# Patient Record
Sex: FEMALE | Race: WHITE | NOT HISPANIC OR LATINO | Employment: UNEMPLOYED | URBAN - METROPOLITAN AREA
[De-identification: names, ages, dates, MRNs, and addresses within clinical notes are randomized per-mention and may not be internally consistent; named-entity substitution may affect disease eponyms.]

---

## 2019-06-20 ENCOUNTER — HOSPITAL ENCOUNTER (EMERGENCY)
Facility: HOSPITAL | Age: 39
Discharge: HOME | End: 2019-06-20
Attending: EMERGENCY MEDICINE | Admitting: EMERGENCY MEDICINE

## 2019-06-20 VITALS
RESPIRATION RATE: 18 BRPM | DIASTOLIC BLOOD PRESSURE: 85 MMHG | HEART RATE: 95 BPM | BODY MASS INDEX: 22.49 KG/M2 | SYSTOLIC BLOOD PRESSURE: 142 MMHG | WEIGHT: 135 LBS | TEMPERATURE: 97.8 F | HEIGHT: 65 IN | OXYGEN SATURATION: 96 %

## 2019-06-20 DIAGNOSIS — M54.12 CERVICAL RADICULOPATHY: Primary | ICD-10-CM

## 2019-06-20 PROCEDURE — 96372 THER/PROPH/DIAG INJ SC/IM: CPT

## 2019-06-20 PROCEDURE — 25010000002 KETOROLAC TROMETHAMINE PER 15 MG: Performed by: PHYSICIAN ASSISTANT

## 2019-06-20 PROCEDURE — 99282 EMERGENCY DEPT VISIT SF MDM: CPT | Performed by: PHYSICIAN ASSISTANT

## 2019-06-20 PROCEDURE — 99283 EMERGENCY DEPT VISIT LOW MDM: CPT

## 2019-06-20 RX ORDER — IBUPROFEN 400 MG/1
400 TABLET ORAL 2 TIMES DAILY
COMMUNITY
End: 2021-03-15 | Stop reason: HOSPADM

## 2019-06-20 RX ORDER — ALPRAZOLAM 2 MG/1
2 TABLET ORAL NIGHTLY PRN
COMMUNITY
End: 2021-03-14

## 2019-06-20 RX ORDER — CETIRIZINE HYDROCHLORIDE 10 MG/1
10 TABLET ORAL DAILY
COMMUNITY
End: 2021-03-14

## 2019-06-20 RX ORDER — KETOROLAC TROMETHAMINE 30 MG/ML
60 INJECTION, SOLUTION INTRAMUSCULAR; INTRAVENOUS ONCE
Status: COMPLETED | OUTPATIENT
Start: 2019-06-20 | End: 2019-06-20

## 2019-06-20 RX ORDER — METHYLPREDNISOLONE 4 MG/1
TABLET ORAL
Qty: 21 TABLET | Refills: 0 | Status: SHIPPED | OUTPATIENT
Start: 2019-06-20 | End: 2019-06-20 | Stop reason: SINTOL

## 2019-06-20 RX ORDER — FLUTICASONE PROPIONATE 50 MCG
2 SPRAY, SUSPENSION (ML) NASAL DAILY
COMMUNITY

## 2019-06-20 RX ORDER — MONTELUKAST SODIUM 10 MG/1
10 TABLET ORAL NIGHTLY
COMMUNITY

## 2019-06-20 RX ORDER — GABAPENTIN 800 MG/1
800 TABLET ORAL 3 TIMES DAILY
COMMUNITY
End: 2021-03-14

## 2019-06-20 RX ADMIN — KETOROLAC TROMETHAMINE 60 MG: 30 INJECTION, SOLUTION INTRAMUSCULAR at 19:22

## 2021-03-14 ENCOUNTER — APPOINTMENT (OUTPATIENT)
Dept: GENERAL RADIOLOGY | Facility: HOSPITAL | Age: 41
End: 2021-03-14

## 2021-03-14 ENCOUNTER — HOSPITAL ENCOUNTER (OUTPATIENT)
Facility: HOSPITAL | Age: 41
Setting detail: OBSERVATION
Discharge: HOME | End: 2021-03-15
Attending: INTERNAL MEDICINE | Admitting: INTERNAL MEDICINE

## 2021-03-14 DIAGNOSIS — R07.9 CHEST PAIN, UNSPECIFIED TYPE: Primary | ICD-10-CM

## 2021-03-14 DIAGNOSIS — R06.00 DYSPNEA, UNSPECIFIED TYPE: ICD-10-CM

## 2021-03-14 PROBLEM — F41.1 GENERALIZED ANXIETY DISORDER: Status: ACTIVE | Noted: 2021-03-14

## 2021-03-14 PROBLEM — R10.819 LEFT FLANK TENDERNESS: Status: ACTIVE | Noted: 2021-03-14

## 2021-03-14 PROBLEM — F41.0 PANIC ATTACKS: Status: ACTIVE | Noted: 2021-03-14

## 2021-03-14 PROBLEM — K21.9 GASTROESOPHAGEAL REFLUX DISEASE WITHOUT ESOPHAGITIS: Status: ACTIVE | Noted: 2021-03-14

## 2021-03-14 LAB
ALBUMIN SERPL-MCNC: 4.8 G/DL (ref 3.5–5.2)
ALBUMIN/GLOB SERPL: 1.9 G/DL
ALP SERPL-CCNC: 128 U/L (ref 39–117)
ALT SERPL W P-5'-P-CCNC: 18 U/L (ref 1–33)
ANION GAP SERPL CALCULATED.3IONS-SCNC: 16 MMOL/L (ref 5–15)
AST SERPL-CCNC: 12 U/L (ref 1–32)
B PARAPERT DNA SPEC QL NAA+PROBE: NOT DETECTED
B PERT DNA SPEC QL NAA+PROBE: NOT DETECTED
BASOPHILS # BLD AUTO: 0.1 10*3/MM3 (ref 0–0.2)
BASOPHILS NFR BLD AUTO: 0.6 % (ref 0–1.5)
BILIRUB SERPL-MCNC: 0.5 MG/DL (ref 0–1.2)
BUN SERPL-MCNC: 6 MG/DL (ref 6–20)
BUN/CREAT SERPL: 8.7 (ref 7–25)
C PNEUM DNA NPH QL NAA+NON-PROBE: NOT DETECTED
CALCIUM SPEC-SCNC: 10.2 MG/DL (ref 8.6–10.5)
CHLORIDE SERPL-SCNC: 106 MMOL/L (ref 98–107)
CO2 SERPL-SCNC: 20 MMOL/L (ref 22–29)
CREAT SERPL-MCNC: 0.69 MG/DL (ref 0.57–1)
D DIMER PPP FEU-MCNC: <0.19 MG/L (FEU) (ref 0–0.59)
DEPRECATED RDW RBC AUTO: 47.7 FL (ref 37–54)
EOSINOPHIL # BLD AUTO: 0 10*3/MM3 (ref 0–0.4)
EOSINOPHIL NFR BLD AUTO: 0.4 % (ref 0.3–6.2)
ERYTHROCYTE [DISTWIDTH] IN BLOOD BY AUTOMATED COUNT: 14.8 % (ref 12.3–15.4)
FLUAV SUBTYP SPEC NAA+PROBE: NOT DETECTED
FLUBV RNA ISLT QL NAA+PROBE: NOT DETECTED
GFR SERPL CREATININE-BSD FRML MDRD: 94 ML/MIN/1.73
GLOBULIN UR ELPH-MCNC: 2.5 GM/DL
GLUCOSE SERPL-MCNC: 128 MG/DL (ref 65–99)
HADV DNA SPEC NAA+PROBE: NOT DETECTED
HCOV 229E RNA SPEC QL NAA+PROBE: NOT DETECTED
HCOV HKU1 RNA SPEC QL NAA+PROBE: NOT DETECTED
HCOV NL63 RNA SPEC QL NAA+PROBE: NOT DETECTED
HCOV OC43 RNA SPEC QL NAA+PROBE: NOT DETECTED
HCT VFR BLD AUTO: 38.8 % (ref 34–46.6)
HGB BLD-MCNC: 14.1 G/DL (ref 12–15.9)
HMPV RNA NPH QL NAA+NON-PROBE: NOT DETECTED
HOLD SPECIMEN: NORMAL
HOLD SPECIMEN: NORMAL
HPIV1 RNA SPEC QL NAA+PROBE: NOT DETECTED
HPIV2 RNA SPEC QL NAA+PROBE: NOT DETECTED
HPIV3 RNA NPH QL NAA+PROBE: NOT DETECTED
HPIV4 P GENE NPH QL NAA+PROBE: NOT DETECTED
LYMPHOCYTES # BLD AUTO: 1.5 10*3/MM3 (ref 0.7–3.1)
LYMPHOCYTES NFR BLD AUTO: 12.2 % (ref 19.6–45.3)
M PNEUMO IGG SER IA-ACNC: NOT DETECTED
MCH RBC QN AUTO: 33.7 PG (ref 26.6–33)
MCHC RBC AUTO-ENTMCNC: 36.5 G/DL (ref 31.5–35.7)
MCV RBC AUTO: 92.5 FL (ref 79–97)
MONOCYTES # BLD AUTO: 0.5 10*3/MM3 (ref 0.1–0.9)
MONOCYTES NFR BLD AUTO: 4.1 % (ref 5–12)
NEUTROPHILS NFR BLD AUTO: 10.4 10*3/MM3 (ref 1.7–7)
NEUTROPHILS NFR BLD AUTO: 82.7 % (ref 42.7–76)
NRBC BLD AUTO-RTO: 0.1 /100 WBC (ref 0–0.2)
NT-PROBNP SERPL-MCNC: 50.6 PG/ML (ref 0–450)
PLATELET # BLD AUTO: 465 10*3/MM3 (ref 140–450)
PMV BLD AUTO: 6.9 FL (ref 6–12)
POTASSIUM SERPL-SCNC: 3.9 MMOL/L (ref 3.5–5.2)
PROT SERPL-MCNC: 7.3 G/DL (ref 6–8.5)
RBC # BLD AUTO: 4.19 10*6/MM3 (ref 3.77–5.28)
RHINOVIRUS RNA SPEC NAA+PROBE: NOT DETECTED
RSV RNA NPH QL NAA+NON-PROBE: NOT DETECTED
SARS-COV-2 RNA NPH QL NAA+NON-PROBE: NOT DETECTED
SODIUM SERPL-SCNC: 142 MMOL/L (ref 136–145)
TROPONIN T SERPL-MCNC: <0.01 NG/ML (ref 0–0.03)
TROPONIN T SERPL-MCNC: <0.01 NG/ML (ref 0–0.03)
WBC # BLD AUTO: 12.6 10*3/MM3 (ref 3.4–10.8)
WHOLE BLOOD HOLD SPECIMEN: NORMAL

## 2021-03-14 PROCEDURE — G0378 HOSPITAL OBSERVATION PER HR: HCPCS

## 2021-03-14 PROCEDURE — 93005 ELECTROCARDIOGRAM TRACING: CPT | Performed by: INTERNAL MEDICINE

## 2021-03-14 PROCEDURE — 99220 PR INITIAL OBSERVATION CARE/DAY 70 MINUTES: CPT | Performed by: INTERNAL MEDICINE

## 2021-03-14 PROCEDURE — 93005 ELECTROCARDIOGRAM TRACING: CPT

## 2021-03-14 PROCEDURE — 99284 EMERGENCY DEPT VISIT MOD MDM: CPT

## 2021-03-14 PROCEDURE — 0202U NFCT DS 22 TRGT SARS-COV-2: CPT | Performed by: NURSE PRACTITIONER

## 2021-03-14 PROCEDURE — 85025 COMPLETE CBC W/AUTO DIFF WBC: CPT | Performed by: NURSE PRACTITIONER

## 2021-03-14 PROCEDURE — 84484 ASSAY OF TROPONIN QUANT: CPT | Performed by: INTERNAL MEDICINE

## 2021-03-14 PROCEDURE — 71045 X-RAY EXAM CHEST 1 VIEW: CPT

## 2021-03-14 PROCEDURE — 80053 COMPREHEN METABOLIC PANEL: CPT | Performed by: NURSE PRACTITIONER

## 2021-03-14 PROCEDURE — 85379 FIBRIN DEGRADATION QUANT: CPT | Performed by: NURSE PRACTITIONER

## 2021-03-14 PROCEDURE — 96374 THER/PROPH/DIAG INJ IV PUSH: CPT

## 2021-03-14 PROCEDURE — 83880 ASSAY OF NATRIURETIC PEPTIDE: CPT | Performed by: NURSE PRACTITIONER

## 2021-03-14 PROCEDURE — 84484 ASSAY OF TROPONIN QUANT: CPT | Performed by: NURSE PRACTITIONER

## 2021-03-14 RX ORDER — NITROGLYCERIN 0.4 MG/1
0.4 TABLET SUBLINGUAL
Status: DISCONTINUED | OUTPATIENT
Start: 2021-03-14 | End: 2021-03-15 | Stop reason: HOSPADM

## 2021-03-14 RX ORDER — ACETAMINOPHEN 500 MG
1000 TABLET ORAL DAILY
COMMUNITY

## 2021-03-14 RX ORDER — ASPIRIN 81 MG/1
324 TABLET, CHEWABLE ORAL ONCE
Status: COMPLETED | OUTPATIENT
Start: 2021-03-14 | End: 2021-03-14

## 2021-03-14 RX ORDER — CHOLECALCIFEROL (VITAMIN D3) 125 MCG
5 CAPSULE ORAL NIGHTLY PRN
Status: DISCONTINUED | OUTPATIENT
Start: 2021-03-14 | End: 2021-03-15 | Stop reason: HOSPADM

## 2021-03-14 RX ORDER — BISACODYL 10 MG
10 SUPPOSITORY, RECTAL RECTAL DAILY PRN
Status: DISCONTINUED | OUTPATIENT
Start: 2021-03-14 | End: 2021-03-15 | Stop reason: HOSPADM

## 2021-03-14 RX ORDER — SODIUM CHLORIDE 0.9 % (FLUSH) 0.9 %
10 SYRINGE (ML) INJECTION EVERY 12 HOURS SCHEDULED
Status: DISCONTINUED | OUTPATIENT
Start: 2021-03-14 | End: 2021-03-15 | Stop reason: HOSPADM

## 2021-03-14 RX ORDER — ALPRAZOLAM 0.5 MG/1
0.5 TABLET ORAL 3 TIMES DAILY PRN
Status: DISCONTINUED | OUTPATIENT
Start: 2021-03-14 | End: 2021-03-15 | Stop reason: HOSPADM

## 2021-03-14 RX ORDER — ACETAMINOPHEN 160 MG/5ML
650 SOLUTION ORAL EVERY 4 HOURS PRN
Status: DISCONTINUED | OUTPATIENT
Start: 2021-03-14 | End: 2021-03-15 | Stop reason: HOSPADM

## 2021-03-14 RX ORDER — ONDANSETRON 2 MG/ML
4 INJECTION INTRAMUSCULAR; INTRAVENOUS EVERY 6 HOURS PRN
Status: DISCONTINUED | OUTPATIENT
Start: 2021-03-14 | End: 2021-03-15 | Stop reason: HOSPADM

## 2021-03-14 RX ORDER — ACETAMINOPHEN 650 MG/1
650 SUPPOSITORY RECTAL EVERY 4 HOURS PRN
Status: DISCONTINUED | OUTPATIENT
Start: 2021-03-14 | End: 2021-03-15 | Stop reason: HOSPADM

## 2021-03-14 RX ORDER — ACETAMINOPHEN 325 MG/1
650 TABLET ORAL EVERY 4 HOURS PRN
Status: DISCONTINUED | OUTPATIENT
Start: 2021-03-14 | End: 2021-03-15 | Stop reason: HOSPADM

## 2021-03-14 RX ORDER — PANTOPRAZOLE SODIUM 40 MG/1
40 TABLET, DELAYED RELEASE ORAL
Status: DISCONTINUED | OUTPATIENT
Start: 2021-03-14 | End: 2021-03-15 | Stop reason: HOSPADM

## 2021-03-14 RX ORDER — MONTELUKAST SODIUM 10 MG/1
10 TABLET ORAL NIGHTLY
Status: DISCONTINUED | OUTPATIENT
Start: 2021-03-15 | End: 2021-03-15 | Stop reason: HOSPADM

## 2021-03-14 RX ORDER — ONDANSETRON 4 MG/1
4 TABLET, FILM COATED ORAL EVERY 6 HOURS PRN
Status: DISCONTINUED | OUTPATIENT
Start: 2021-03-14 | End: 2021-03-15 | Stop reason: HOSPADM

## 2021-03-14 RX ORDER — SODIUM CHLORIDE 0.9 % (FLUSH) 0.9 %
10 SYRINGE (ML) INJECTION AS NEEDED
Status: DISCONTINUED | OUTPATIENT
Start: 2021-03-14 | End: 2021-03-15 | Stop reason: HOSPADM

## 2021-03-14 RX ORDER — BISACODYL 5 MG/1
5 TABLET, DELAYED RELEASE ORAL DAILY PRN
Status: DISCONTINUED | OUTPATIENT
Start: 2021-03-14 | End: 2021-03-15 | Stop reason: HOSPADM

## 2021-03-14 RX ADMIN — ASPIRIN 324 MG: 81 TABLET, CHEWABLE ORAL at 17:33

## 2021-03-14 RX ADMIN — Medication 5 MG: at 21:02

## 2021-03-14 RX ADMIN — Medication 10 ML: at 19:19

## 2021-03-14 RX ADMIN — ALPRAZOLAM 0.5 MG: 0.5 TABLET ORAL at 22:22

## 2021-03-14 RX ADMIN — ACETAMINOPHEN 650 MG: 325 TABLET ORAL at 21:02

## 2021-03-14 RX ADMIN — PANTOPRAZOLE SODIUM 40 MG: 40 TABLET, DELAYED RELEASE ORAL at 19:19

## 2021-03-14 NOTE — H&P
[facility]      Patient Name: Pamela Bateman  : 1980  MRN: [id number]  Primary Care Physician: Provider, No Known  Date of admission: 3/14/2021    Patient Care Team:  Provider, No Known as PCP - General          Subjective   History Present Illness     Chief Complaint:   Chief Complaint   Patient presents with   • Chest Pain         Ms. Bateman is a 40 y.o. female with anxiety/depression, panic attacks, GERD, chronic back pain, history of substance abuse, and history of pneumothorax, who presents to [facility] complaining of multiple vague complaints but mostly left-sided chest wall pain.  She is a poor historian due to her untreated anxiety.  She tends to go off on tangential topics, and her speech is a little pressured.  She presents with progressive and worsening reflux and heartburn which has caused some pain in the left and center of her chest radiating up towards the left shoulder.  She then began to experience some pain in her left flank and lateral chest wall.  She thought perhaps this was related to her previous history of pneumothorax especially when she became short of air so she came in for further evaluation.    She has been taking 1 Xanax and 1 Norco 7.5 every night without prescription per INSPECT reviewed.  She has lost her insurance and has delayed ongoing medical care for several months due to the pandemic and also while caring for her mother who is terminally ill with cancer.           Review of Systems   Constitutional: Negative. Negative for chills and fever.   HENT: Positive for congestion.         Sinus drainage   Cardiovascular: Positive for chest pain. Negative for leg swelling, orthopnea, palpitations and syncope.   Respiratory: Positive for shortness of breath. Negative for cough, sputum production and wheezing.    Skin: Negative.  Negative for itching, rash and suspicious lesions.   Musculoskeletal: Positive for back pain.    Gastrointestinal: Positive for bloating, abdominal pain, heartburn and nausea. Negative for constipation, diarrhea and vomiting.   Genitourinary: Positive for flank pain. Negative for dysuria, frequency, hematuria, nocturia and urgency.   Neurological: Negative.    Psychiatric/Behavioral: The patient is nervous/anxious.    All other systems reviewed and are negative.          Personal History     Past Medical History:   Past Medical History:   Diagnosis Date   • Anxiety    • Chronic back pain    • Depression    • Hx of pneumothorax    • Neuropathy    • Sciatica    • Seasonal allergies        Surgical History:      Past Surgical History:   Procedure Laterality Date   • APPENDECTOMY     • BACK SURGERY     • EAR TUBES     • TUBAL ABDOMINAL LIGATION     • WISDOM TOOTH EXTRACTION         Family History: family history includes Hearing loss in her maternal grandfather and maternal grandmother; Heart disease in her maternal grandfather, maternal grandmother, and mother; Hyperlipidemia in her maternal grandfather, maternal grandmother, and mother; Hypertension in her mother. Otherwise pertinent FHx was reviewed and unremarkable.     Social History:  reports that she has been smoking cigarettes. She has been smoking about 1.00 pack per day. She does not have any smokeless tobacco history on file. She reports current alcohol use. She reports that she does not use drugs.      Medications:  Prior to Admission medications    Medication Sig Start Date End Date Taking? Authorizing Provider   acetaminophen (TYLENOL) 500 MG tablet Take 1,000 mg by mouth Daily.   Yes Adarsh Lockhart MD   fluticasone (FLONASE) 50 MCG/ACT nasal spray 2 sprays into the nostril(s) as directed by provider Daily.   Yes Adarsh Lockhart MD   ibuprofen (ADVIL,MOTRIN) 400 MG tablet Take 400 mg by mouth 2 (two) times a day.   Yes Adarsh Lockhart MD   montelukast (SINGULAIR) 10 MG tablet Take 10 mg by mouth Every Night.   Yes Richy  MD Adarsh   ALPRAZolam (XANAX) 2 MG tablet Take 2 mg by mouth At Night As Needed for Anxiety.  3/14/21  ProviderAdarsh MD   cetirizine (zyrTEC) 10 MG tablet Take 10 mg by mouth Daily.  3/14/21  Adarsh Lockhart MD   gabapentin (NEURONTIN) 800 MG tablet Take 800 mg by mouth 3 (Three) Times a Day.  3/14/21  ProviderAdarsh MD       Allergies:  No Known Allergies    Objective   Objective     Vital Signs  Temp:  [98.2 °F (36.8 °C)] 98.2 °F (36.8 °C)  Heart Rate:  [79-81] 79  Resp:  [22] 22  BP: (135-136)/(80-84) 136/84  SpO2:  [91 %-100 %] 91 %  on   ;   Device (Oxygen Therapy): room air  Body mass index is 23.55 kg/m².    Physical Exam  Vitals reviewed.   Constitutional:       General: She is not in acute distress.     Appearance: Normal appearance. She is normal weight. She is not ill-appearing or toxic-appearing.   HENT:      Head: Normocephalic and atraumatic.      Nose: Nose normal.      Mouth/Throat:      Mouth: Mucous membranes are moist.      Pharynx: Oropharynx is clear.   Eyes:      General: No scleral icterus.     Extraocular Movements: Extraocular movements intact.      Conjunctiva/sclera: Conjunctivae normal.      Pupils: Pupils are equal, round, and reactive to light.   Cardiovascular:      Rate and Rhythm: Normal rate and regular rhythm.      Pulses: Normal pulses.      Heart sounds: Normal heart sounds. No murmur.   Pulmonary:      Effort: Pulmonary effort is normal. No respiratory distress.      Breath sounds: Normal breath sounds. No stridor. No wheezing, rhonchi or rales.   Abdominal:      General: Abdomen is flat. Bowel sounds are normal. There is no distension.      Palpations: Abdomen is soft.      Tenderness: There is no abdominal tenderness. There is left CVA tenderness.   Musculoskeletal:         General: No swelling. Normal range of motion.      Cervical back: Normal range of motion and neck supple.   Skin:     General: Skin is warm and dry.   Neurological:      General:  No focal deficit present.      Mental Status: She is alert and oriented to person, place, and time. Mental status is at baseline.      Motor: No weakness.   Psychiatric:         Mood and Affect: Mood is anxious.         Speech: Speech is rapid and pressured and tangential.         Behavior: Behavior is cooperative.         Thought Content: Thought content normal.         Cognition and Memory: Cognition and memory normal.           Results Review:  I have personally reviewed most recent cardiac tracings, lab results and radiology images and interpretations and agree with findings.    Results from last 7 days   Lab Units 03/14/21  1549   WBC 10*3/mm3 12.60*   HEMOGLOBIN g/dL 14.1   HEMATOCRIT % 38.8   PLATELETS 10*3/mm3 465*     Results from last 7 days   Lab Units 03/14/21  1549   SODIUM mmol/L 142   POTASSIUM mmol/L 3.9   CHLORIDE mmol/L 106   CO2 mmol/L 20.0*   BUN mg/dL 6   CREATININE mg/dL 0.69   GLUCOSE mg/dL 128*   CALCIUM mg/dL 10.2   ALT (SGPT) U/L 18   AST (SGOT) U/L 12   TROPONIN T ng/mL <0.010   PROBNP pg/mL 50.6     Estimated Creatinine Clearance: 109.8 mL/min (by C-G formula based on SCr of 0.69 mg/dL).  Brief Urine Lab Results     None          Microbiology Results (last 10 days)     Procedure Component Value - Date/Time    Respiratory Panel PCR w/COVID-19(SARS-CoV-2) [facility] In-House, NP Swab in UTM/VTM, 3-4 HR TAT - Swab, Nasopharynx [[id number]]  (Normal) Collected: 03/14/21 1550    Lab Status: Final result Specimen: Swab from Nasopharynx Updated: 03/14/21 1644     ADENOVIRUS, PCR Not Detected     Coronavirus 229E Not Detected     Coronavirus HKU1 Not Detected     Coronavirus NL63 Not Detected     Coronavirus OC43 Not Detected     COVID19 Not Detected     Human Metapneumovirus Not Detected     Human Rhinovirus/Enterovirus Not Detected     Influenza A PCR Not Detected     Influenza B PCR Not Detected     Parainfluenza Virus 1 Not Detected     Parainfluenza Virus 2 Not Detected      Parainfluenza Virus 3 Not Detected     Parainfluenza Virus 4 Not Detected     RSV, PCR Not Detected     Bordetella pertussis pcr Not Detected     Bordetella parapertussis PCR Not Detected     Chlamydophila pneumoniae PCR Not Detected     Mycoplasma pneumo by PCR Not Detected    Narrative:      Fact sheet for providers: https://docs.[employer]/wp-content/uploads/VKM6995-7836-FC4.1-EUA-Provider-Fact-Sheet-3.pdf    Fact sheet for patients: https://docs.[employer]/wp-content/uploads/VDD4267-5364-GJ0.1-EUA-Patient-Fact-Sheet-1.pdf    Test performed by PCR.          ECG/EMG Results (most recent)     Procedure Component Value Units Date/Time    ECG 12 Lead [[id number]] Collected: 03/14/21 1538     Updated: 03/14/21 1539     QT Interval 357 ms     Narrative:      HEART RATE= 81  bpm  RR Interval= 744  ms  MN Interval= 139  ms  P Horizontal Axis= -19  deg  P Front Axis= 64  deg  QRSD Interval= 88  ms  QT Interval= 357  ms  QRS Axis= 73  deg  T Wave Axis= 57  deg  - NORMAL ECG -  Sinus rhythm  Electronically Signed By:   Date and Time of Study: 2021-03-14 15:38:14                  XR Chest 1 View    Result Date: 3/14/2021  No active disease.  Electronically Signed By-Ant Vincent MD On:3/14/2021 4:29 PM This report was finalized on [id number] by  Ant Vincent MD.        Estimated Creatinine Clearance: 109.8 mL/min (by C-G formula based on SCr of 0.69 mg/dL).    Assessment/Plan   Assessment/Plan       Active Hospital Problems    Diagnosis  POA   • Chest pain [R07.9]  Yes   • Dyspnea [R06.00]  Unknown      Resolved Hospital Problems   No resolved problems to display.     Chest pain  -Strong family history of cardiac disease with hypertension and hyperlipidemia  -Will trend serial troponins to rule out ACS  -Treadmill stress test in a.m. to rule out MI  -Consider alternate form of imaging to evaluate for possible pneumothorax as a source for chest pain    Severe reflux  -Start patient on PPI, may need to add Carafate  -If  no improvement, may consider gastroenterology evaluation    Left flank pain  -Known history of kidney stones  -Check CT PE protocol    Anxiety/panic attacks/depression  -Patient has been taking Xanax on a regular basis without a prescription  -Consult psychiatry  -Consult  to help patient get tied in to [employer] [facility] [employer] as outpatient, as well as regain access to insurance    Additional work-up to be planned as clinical course progresses      VTE Prophylaxis -   Mechanical Order History:      Ordered        03/14/21 1758  Place Sequential Compression Device  Once         03/14/21 1758  Maintain Sequential Compression Device  Continuous                 Pharmalogical Order History:      Ordered     Dose Route Frequency Stop    03/14/21 1802  enoxaparin (LOVENOX) syringe 40 mg      40 mg SC Every 24 Hours --    03/14/21 1758  Pharmacy to Dose enoxaparin (LOVENOX)     Question:  Indication of use  Answer:  Prophylaxis    -- XX Continuous PRN --                CODE STATUS:    Code Status and Medical Interventions:   Ordered at: 03/14/21 1722     Level Of Support Discussed With:    Patient     Code Status:    CPR     Medical Interventions (Level of Support Prior to Arrest):    Full       This patient has been examined wearing appropriate Personal Protective Equipment. 03/14/21      I discussed the patient's findings and my recommendations with patient and nursing staff.      Signature: Electronically signed by Yael Mccrary MD, 03/14/21, 6:29 PM EDT.      [facility] Hospitalist Team

## 2021-03-14 NOTE — PLAN OF CARE
Problem: Chest Pain  Goal: Resolution of Chest Pain Symptoms  Outcome: Ongoing, Not Progressing     Problem: Adult Inpatient Plan of Care  Goal: Plan of Care Review  Outcome: Ongoing, Not Progressing  Flowsheets (Taken 3/14/2021 3342)  Progress: no change  Plan of Care Reviewed With: patient  Outcome Summary: New admit for chest pian will have a stress test tomorrow, will monitor   Goal Outcome Evaluation:  Plan of Care Reviewed With: patient  Progress: no change  Outcome Summary: New admit for chest pian will have a stress test tomorrow, will monitor

## 2021-03-14 NOTE — ED NOTES
Pt reports she has hx of collapsed lung on the right and is now having similar sx. PT reports intermittent chest pain over the past several days and severe SOA today.        Nancy Lopez, RN  03/14/21 4475

## 2021-03-14 NOTE — ED PROVIDER NOTES
Subjective   Patient is a 40-year-old female, who presents emergency department complaint of left-sided chest pain, Radiates into the left shoulder, onset today.  She reports proceed shortness of breath, she does report this feels similar to the time she had a spontaneous pneumothorax in the past.  She denies any abnormal leg pain or swelling.  No fever or chills.  No cough.  No history of COVID-19.  She has not yet been vaccinated for COVID-19.  No history of IV drug abuse.          Review of Systems   Constitutional: Negative for chills and fever.   Respiratory: Positive for chest tightness and shortness of breath.    Cardiovascular: Positive for chest pain. Negative for palpitations and leg swelling.   Gastrointestinal: Negative for abdominal pain, diarrhea, nausea and vomiting.   Genitourinary: Negative for dysuria.   Musculoskeletal: Negative for back pain and neck pain.   Skin: Negative for color change and rash.   Neurological: Negative for dizziness, syncope and light-headedness.       Past Medical History:   Diagnosis Date   • Anxiety    • Chronic back pain    • Depression    • Hx of pneumothorax    • Neuropathy    • Sciatica    • Seasonal allergies        No Known Allergies    Past Surgical History:   Procedure Laterality Date   • APPENDECTOMY     • BACK SURGERY     • EAR TUBES     • TUBAL ABDOMINAL LIGATION     • WISDOM TOOTH EXTRACTION         Family History   Problem Relation Age of Onset   • Heart disease Mother    • Hyperlipidemia Mother    • Hypertension Mother    • Hearing loss Maternal Grandmother    • Heart disease Maternal Grandmother    • Hyperlipidemia Maternal Grandmother    • Hearing loss Maternal Grandfather    • Heart disease Maternal Grandfather    • Hyperlipidemia Maternal Grandfather        Social History     Socioeconomic History   • Marital status: Single     Spouse name: Not on file   • Number of children: Not on file   • Years of education: Not on file   • Highest education level:  "Not on file   Tobacco Use   • Smoking status: Current Every Day Smoker     Packs/day: 1.00     Types: Cigarettes   Substance and Sexual Activity   • Alcohol use: Yes     Comment: occasionally   • Drug use: No   • Sexual activity: Yes     Partners: Male           Objective   Physical Exam  Vitals and nursing note reviewed.   Constitutional:       Appearance: She is well-developed.   HENT:      Head: Normocephalic and atraumatic.   Cardiovascular:      Rate and Rhythm: Normal rate and regular rhythm.      Pulses:           Radial pulses are 2+ on the right side and 2+ on the left side.        Dorsalis pedis pulses are 2+ on the right side and 2+ on the left side.      Heart sounds: Normal heart sounds.   Pulmonary:      Effort: Tachypnea present.      Breath sounds: Normal breath sounds.   Abdominal:      General: Bowel sounds are normal.      Palpations: Abdomen is soft.   Musculoskeletal:         General: Normal range of motion.      Cervical back: Normal range of motion and neck supple.      Right lower leg: No tenderness. No edema.      Left lower leg: No tenderness. No edema.   Skin:     General: Skin is warm and dry.      Capillary Refill: Capillary refill takes less than 2 seconds.   Neurological:      General: No focal deficit present.      Mental Status: She is alert and oriented to person, place, and time.   Psychiatric:         Mood and Affect: Mood normal.         Behavior: Behavior normal.         Procedures           ED Course  /79 (BP Location: Right arm, Patient Position: Lying)   Pulse 88   Temp 98.3 °F (36.8 °C) (Oral)   Resp 18   Ht 165.1 cm (65\")   Wt 63 kg (138 lb 12.8 oz)   LMP 03/14/2021   SpO2 96%   BMI 23.10 kg/m²   Labs Reviewed   COMPREHENSIVE METABOLIC PANEL - Abnormal; Notable for the following components:       Result Value    Glucose 128 (*)     CO2 20.0 (*)     Alkaline Phosphatase 128 (*)     Anion Gap 16.0 (*)     All other components within normal limits    Narrative:  "    GFR Normal >60  Chronic Kidney Disease <60  Kidney Failure <15     CBC WITH AUTO DIFFERENTIAL - Abnormal; Notable for the following components:    WBC 12.60 (*)     MCH 33.7 (*)     MCHC 36.5 (*)     Platelets 465 (*)     Neutrophil % 82.7 (*)     Lymphocyte % 12.2 (*)     Monocyte % 4.1 (*)     Neutrophils, Absolute 10.40 (*)     All other components within normal limits   RESPIRATORY PANEL PCR W/ COVID-19 (SARS-COV-2) GINA/THERESE/GEN/PAD/COR/MAD/[facility] IN-HOUSE, NP SWAB IN UT/VTP, 3-4 HR TAT - Normal    Narrative:     Fact sheet for providers: https://docs.[employer]/wp-content/uploads/BEZ4334-6481-VZ4.1-EUA-Provider-Fact-Sheet-3.pdf    Fact sheet for patients: https://[employer].V [employer]/wp-content/uploads/DEB9385-0760-ZL5.1-EUA-Patient-Fact-Sheet-1.pdf    Test performed by PCR.   BNP (IN-HOUSE) - Normal    Narrative:     Among patients with dyspnea, NT-proBNP is highly sensitive for the detection of acute congestive heart failure. In addition NT-proBNP of <300 pg/ml effectively rules out acute congestive heart failure with 99% negative predictive value.    Results may be falsely decreased if patient taking Biotin.     TROPONIN (IN-HOUSE) - Normal    Narrative:     Troponin T Reference Range:  <= 0.03 ng/mL-   Negative for AMI  >0.03 ng/mL-     Abnormal for myocardial necrosis.  Clinicians would have to utilize clinical acumen, EKG, Troponin and serial changes to determine if it is an Acute Myocardial Infarction or myocardial injury due to an underlying chronic condition.       Results may be falsely decreased if patient taking Biotin.     D-DIMER, QUANTITATIVE - Normal    Narrative:     Reference Range  --------------------------------------------------------------------     < 0.50   Negative Predictive Value  0.50-0.59   Indeterminate    >= 0.60   Probable VTE             A very low percentage of patients with DVT may yield D-Dimer results   below the cut-off of 0.50 mg/L FEU.  This is known to be more    prevalent in patients with distal DVT.             Results of this test should always be interpreted in conjunction with   the patient's medical history, clinical presentation and other   findings.  Clinical diagnosis should not be based on the result of   INNOVANCE D-Dimer alone.   TROPONIN (IN-HOUSE)   TROPONIN (IN-HOUSE)   CBC AND DIFFERENTIAL    Narrative:     The following orders were created for panel order CBC & Differential.  Procedure                               Abnormality         Status                     ---------                               -----------         ------                     CBC Auto Differential[[id number]]        Abnormal            Final result                 Please view results for these tests on the individual orders.   EXTRA TUBES    Narrative:     The following orders were created for panel order Extra Tubes.  Procedure                               Abnormality         Status                     ---------                               -----------         ------                     Light Blue Top[[id number]]                                   Final result               Gold Top - SST[[id number]]                                   Final result               Green Top (Gel)[[id number]]                                  Final result                 Please view results for these tests on the individual orders.   LIGHT BLUE TOP   GOLD TOP - SST   GREEN TOP     Medications   sodium chloride 0.9 % flush 10 mL (has no administration in time range)   sodium chloride 0.9 % flush 10 mL (has no administration in time range)   sodium chloride 0.9 % flush 10 mL (has no administration in time range)   Pharmacy to Dose enoxaparin (LOVENOX) (has no administration in time range)   nitroglycerin (NITROSTAT) SL tablet 0.4 mg (has no administration in time range)   acetaminophen (TYLENOL) tablet 650 mg (has no administration in time range)     Or   acetaminophen (TYLENOL) 160 MG/5ML solution 650 mg (has  no administration in time range)     Or   acetaminophen (TYLENOL) suppository 650 mg (has no administration in time range)   melatonin tablet 5 mg (has no administration in time range)   bisacodyl (DULCOLAX) EC tablet 5 mg (has no administration in time range)   bisacodyl (DULCOLAX) suppository 10 mg (has no administration in time range)   ondansetron (ZOFRAN) tablet 4 mg (has no administration in time range)     Or   ondansetron (ZOFRAN) injection 4 mg (has no administration in time range)   montelukast (SINGULAIR) tablet 10 mg (has no administration in time range)   enoxaparin (LOVENOX) syringe 40 mg (has no administration in time range)   pantoprazole (PROTONIX) EC tablet 40 mg (has no administration in time range)   aspirin chewable tablet 324 mg (324 mg Oral Given 3/14/21 1733)     XR Chest 1 View    Result Date: 3/14/2021  No active disease.  Electronically Signed By-Ant Vincent MD On:3/14/2021 4:29 PM This report was finalized on [id number] by  Ant Vincent MD.      ED Course as of Mar 14 1907   Sun Mar 14, 2021   1717 EKG independently viewed by me, Interpreted by ED physicisan Dr. Cevallos.  Rate: 81Rhythm:SRPrevious EKG:none    [LB]      ED Course User Index  [LB] Latanya Estrada, APRN      Appropriate PPE was worn during the duration of the care for this patient while in the emergency department per [facility] guidelines                HEART Score: 1                      MDM  Number of Diagnoses or Management Options  Chest pain, unspecified type  Dyspnea, unspecified type  Diagnosis management comments: Differentials: Pneumothorax, angina, chest wall pain  This list is not all inclusive and does not constitute the entireity of considered causes.     Labs reviewed by me and significant for the following: Abnormal Labs Reviewed  COMPREHENSIVE METABOLIC PANEL - Abnormal; Notable for the following components:     Glucose                       128 (*)                CO2                            20.0 (*)               Alkaline Phosphatase          128 (*)                Anion Gap                     16.0 (*)            All other components within normal limits         Narrative: GFR Normal >60                  Chronic Kidney Disease <60                  Kidney Failure <15                    CBC WITH AUTO DIFFERENTIAL - Abnormal; Notable for the following components:     WBC                           12.60 (*)               MCH                           33.7 (*)               MCHC                          36.5 (*)               Platelets                     465 (*)                Neutrophil %                  82.7 (*)               Lymphocyte %                  12.2 (*)               Monocyte %                    4.1 (*)                Neutrophils, Absolute         10.40 (*)            All other components within normal limits      Imaging, Interpreted per radiologist, independently viewed by myself: XR Chest 1 View    Result Date: 3/14/2021  No active disease.  Electronically Signed By-Ant Vincent MD On:3/14/2021 4:29 PM This report was finalized on [id number] by  Ant Vincent MD.        Patient was brought back to the emergency department room for evaluation and  placed on appropriate monitoring.   IV was established, blood work obtained.  Vital signs have  been reviewed. Patient is afebrile.  She underwent the above exam and work-up.  Given her chest pain, dyspnea, she would be in the hospital service for further evaluation management.    Plan and Disposition: I discussed with the patient their test results, work-up here in the emergency department, and need for admission and further evaluation.  Patient is agreeable to the plan of care.  Opportunity was provided for questions at the bedside, all questions and concerns were addressed.           Amount and/or Complexity of Data Reviewed  Clinical lab tests: reviewed  Tests in the radiology section of CPT®: reviewed  Tests in the medicine section of  CPT®: reviewed  Discuss the patient with other providers: yes (Dr. Mccrary)    Patient Progress  Patient progress: stable      Final diagnoses:   Chest pain, unspecified type   Dyspnea, unspecified type            Latanya Estrada, APRN  03/14/21 1908

## 2021-03-15 ENCOUNTER — APPOINTMENT (OUTPATIENT)
Dept: NUCLEAR MEDICINE | Facility: HOSPITAL | Age: 41
End: 2021-03-15

## 2021-03-15 ENCOUNTER — APPOINTMENT (OUTPATIENT)
Dept: CT IMAGING | Facility: HOSPITAL | Age: 41
End: 2021-03-15

## 2021-03-15 ENCOUNTER — APPOINTMENT (OUTPATIENT)
Dept: CARDIOLOGY | Facility: HOSPITAL | Age: 41
End: 2021-03-15

## 2021-03-15 VITALS
WEIGHT: 139.11 LBS | HEART RATE: 73 BPM | BODY MASS INDEX: 23.18 KG/M2 | DIASTOLIC BLOOD PRESSURE: 68 MMHG | SYSTOLIC BLOOD PRESSURE: 127 MMHG | OXYGEN SATURATION: 98 % | TEMPERATURE: 98.2 F | RESPIRATION RATE: 14 BRPM | HEIGHT: 65 IN

## 2021-03-15 LAB
ANION GAP SERPL CALCULATED.3IONS-SCNC: 11 MMOL/L (ref 5–15)
BASOPHILS # BLD AUTO: 0.1 10*3/MM3 (ref 0–0.2)
BASOPHILS NFR BLD AUTO: 0.8 % (ref 0–1.5)
BH CV REST NUCLEAR ISOTOPE DOSE: 7.3 MCI
BH CV STRESS BP STAGE 1: NORMAL
BH CV STRESS BP STAGE 1: NORMAL
BH CV STRESS BP STAGE 2: NORMAL
BH CV STRESS COMMENTS STAGE 1: NORMAL
BH CV STRESS COMMENTS STAGE 2: NORMAL
BH CV STRESS DOSE REGADENOSON STAGE 1: 0.4
BH CV STRESS DURATION MIN STAGE 1: 0
BH CV STRESS DURATION MIN STAGE 1: 3
BH CV STRESS DURATION MIN STAGE 2: 4
BH CV STRESS DURATION SEC STAGE 1: 0
BH CV STRESS DURATION SEC STAGE 1: 10
BH CV STRESS DURATION SEC STAGE 2: 0
BH CV STRESS GRADE STAGE 1: 10
BH CV STRESS HR STAGE 1: 132
BH CV STRESS HR STAGE 1: 133
BH CV STRESS HR STAGE 2: 99
BH CV STRESS METS STAGE 1: 4.6
BH CV STRESS NUCLEAR ISOTOPE DOSE: 21.7 MCI
BH CV STRESS PROTOCOL 1: NORMAL
BH CV STRESS PROTOCOL 1: NORMAL
BH CV STRESS RECOVERY BP: NORMAL MMHG
BH CV STRESS RECOVERY BP: NORMAL MMHG
BH CV STRESS RECOVERY HR: 92 BPM
BH CV STRESS RECOVERY HR: 99 BPM
BH CV STRESS SPEED STAGE 1: 1.7
BH CV STRESS STAGE 1: 1
BH CV STRESS STAGE 1: 1
BH CV STRESS STAGE 2: 2
BUN SERPL-MCNC: 9 MG/DL (ref 6–20)
BUN/CREAT SERPL: 14.1 (ref 7–25)
CALCIUM SPEC-SCNC: 9.1 MG/DL (ref 8.6–10.5)
CHLORIDE SERPL-SCNC: 105 MMOL/L (ref 98–107)
CHOLEST SERPL-MCNC: 153 MG/DL (ref 0–200)
CO2 SERPL-SCNC: 26 MMOL/L (ref 22–29)
CREAT SERPL-MCNC: 0.64 MG/DL (ref 0.57–1)
DEPRECATED RDW RBC AUTO: 48.1 FL (ref 37–54)
EOSINOPHIL # BLD AUTO: 0.1 10*3/MM3 (ref 0–0.4)
EOSINOPHIL NFR BLD AUTO: 0.8 % (ref 0.3–6.2)
ERYTHROCYTE [DISTWIDTH] IN BLOOD BY AUTOMATED COUNT: 14.7 % (ref 12.3–15.4)
GFR SERPL CREATININE-BSD FRML MDRD: 103 ML/MIN/1.73
GLUCOSE SERPL-MCNC: 122 MG/DL (ref 65–99)
HCT VFR BLD AUTO: 37.6 % (ref 34–46.6)
HDLC SERPL-MCNC: 43 MG/DL (ref 40–60)
HGB BLD-MCNC: 13.2 G/DL (ref 12–15.9)
LDLC SERPL CALC-MCNC: 98 MG/DL (ref 0–100)
LDLC/HDLC SERPL: 2.28 {RATIO}
LV EF NUC BP: 54 %
LYMPHOCYTES # BLD AUTO: 3.2 10*3/MM3 (ref 0.7–3.1)
LYMPHOCYTES NFR BLD AUTO: 32.1 % (ref 19.6–45.3)
MAXIMAL PREDICTED HEART RATE: 180 BPM
MAXIMAL PREDICTED HEART RATE: 180 BPM
MCH RBC QN AUTO: 32.9 PG (ref 26.6–33)
MCHC RBC AUTO-ENTMCNC: 35.1 G/DL (ref 31.5–35.7)
MCV RBC AUTO: 93.8 FL (ref 79–97)
MONOCYTES # BLD AUTO: 0.8 10*3/MM3 (ref 0.1–0.9)
MONOCYTES NFR BLD AUTO: 7.6 % (ref 5–12)
NEUTROPHILS NFR BLD AUTO: 5.9 10*3/MM3 (ref 1.7–7)
NEUTROPHILS NFR BLD AUTO: 58.7 % (ref 42.7–76)
NRBC BLD AUTO-RTO: 0.1 /100 WBC (ref 0–0.2)
PERCENT MAX PREDICTED HR: 73.33 %
PERCENT MAX PREDICTED HR: 73.89 %
PLATELET # BLD AUTO: 463 10*3/MM3 (ref 140–450)
PMV BLD AUTO: 6.9 FL (ref 6–12)
POTASSIUM SERPL-SCNC: 3.5 MMOL/L (ref 3.5–5.2)
RBC # BLD AUTO: 4.01 10*6/MM3 (ref 3.77–5.28)
SODIUM SERPL-SCNC: 142 MMOL/L (ref 136–145)
STRESS BASELINE BP: NORMAL MMHG
STRESS BASELINE BP: NORMAL MMHG
STRESS BASELINE HR: 72 BPM
STRESS BASELINE HR: 76 BPM
STRESS PERCENT HR: 86 %
STRESS PERCENT HR: 87 %
STRESS POST ESTIMATED WORKLOAD: 4.6 METS
STRESS POST EXERCISE DUR MIN: 3 MIN
STRESS POST EXERCISE DUR SEC: 19 SEC
STRESS POST PEAK BP: NORMAL MMHG
STRESS POST PEAK BP: NORMAL MMHG
STRESS POST PEAK HR: 132 BPM
STRESS POST PEAK HR: 133 BPM
STRESS TARGET HR: 153 BPM
STRESS TARGET HR: 153 BPM
TRIGL SERPL-MCNC: 60 MG/DL (ref 0–150)
TROPONIN T SERPL-MCNC: <0.01 NG/ML (ref 0–0.03)
TROPONIN T SERPL-MCNC: <0.01 NG/ML (ref 0–0.03)
VLDLC SERPL-MCNC: 12 MG/DL (ref 5–40)
WBC # BLD AUTO: 10.1 10*3/MM3 (ref 3.4–10.8)

## 2021-03-15 PROCEDURE — G0378 HOSPITAL OBSERVATION PER HR: HCPCS

## 2021-03-15 PROCEDURE — 93017 CV STRESS TEST TRACING ONLY: CPT

## 2021-03-15 PROCEDURE — 74176 CT ABD & PELVIS W/O CONTRAST: CPT

## 2021-03-15 PROCEDURE — 25010000002 ONDANSETRON PER 1 MG: Performed by: INTERNAL MEDICINE

## 2021-03-15 PROCEDURE — 93016 CV STRESS TEST SUPVJ ONLY: CPT | Performed by: INTERNAL MEDICINE

## 2021-03-15 PROCEDURE — 93018 CV STRESS TEST I&R ONLY: CPT | Performed by: INTERNAL MEDICINE

## 2021-03-15 PROCEDURE — 80048 BASIC METABOLIC PNL TOTAL CA: CPT | Performed by: INTERNAL MEDICINE

## 2021-03-15 PROCEDURE — 96372 THER/PROPH/DIAG INJ SC/IM: CPT

## 2021-03-15 PROCEDURE — 25010000002 REGADENOSON 0.4 MG/5ML SOLUTION: Performed by: INTERNAL MEDICINE

## 2021-03-15 PROCEDURE — 99244 OFF/OP CNSLTJ NEW/EST MOD 40: CPT | Performed by: PHYSICIAN ASSISTANT

## 2021-03-15 PROCEDURE — 0 TECHNETIUM SESTAMIBI: Performed by: INTERNAL MEDICINE

## 2021-03-15 PROCEDURE — A9500 TC99M SESTAMIBI: HCPCS | Performed by: INTERNAL MEDICINE

## 2021-03-15 PROCEDURE — 25010000002 ENOXAPARIN PER 10 MG: Performed by: INTERNAL MEDICINE

## 2021-03-15 PROCEDURE — 99217 PR OBSERVATION CARE DISCHARGE MANAGEMENT: CPT | Performed by: INTERNAL MEDICINE

## 2021-03-15 PROCEDURE — 93018 CV STRESS TEST I&R ONLY: CPT | Performed by: NURSE PRACTITIONER

## 2021-03-15 PROCEDURE — 85025 COMPLETE CBC W/AUTO DIFF WBC: CPT | Performed by: INTERNAL MEDICINE

## 2021-03-15 PROCEDURE — 78452 HT MUSCLE IMAGE SPECT MULT: CPT | Performed by: INTERNAL MEDICINE

## 2021-03-15 PROCEDURE — 78452 HT MUSCLE IMAGE SPECT MULT: CPT

## 2021-03-15 PROCEDURE — 84484 ASSAY OF TROPONIN QUANT: CPT | Performed by: INTERNAL MEDICINE

## 2021-03-15 PROCEDURE — 80061 LIPID PANEL: CPT | Performed by: INTERNAL MEDICINE

## 2021-03-15 PROCEDURE — 96374 THER/PROPH/DIAG INJ IV PUSH: CPT

## 2021-03-15 RX ORDER — DULOXETIN HYDROCHLORIDE 30 MG/1
30 CAPSULE, DELAYED RELEASE ORAL DAILY
Status: DISCONTINUED | OUTPATIENT
Start: 2021-03-15 | End: 2021-03-15 | Stop reason: HOSPADM

## 2021-03-15 RX ORDER — ONDANSETRON 4 MG/1
4 TABLET, FILM COATED ORAL EVERY 6 HOURS PRN
Qty: 20 TABLET | Refills: 0 | Status: SHIPPED | OUTPATIENT
Start: 2021-03-15

## 2021-03-15 RX ORDER — GABAPENTIN 100 MG/1
100 CAPSULE ORAL 3 TIMES DAILY
Status: DISCONTINUED | OUTPATIENT
Start: 2021-03-15 | End: 2021-03-15 | Stop reason: HOSPADM

## 2021-03-15 RX ORDER — PANTOPRAZOLE SODIUM 40 MG/1
40 TABLET, DELAYED RELEASE ORAL DAILY
Qty: 30 TABLET | Refills: 0 | Status: SHIPPED | OUTPATIENT
Start: 2021-03-15

## 2021-03-15 RX ORDER — DULOXETIN HYDROCHLORIDE 30 MG/1
30 CAPSULE, DELAYED RELEASE ORAL DAILY
Qty: 30 CAPSULE | Refills: 0 | Status: SHIPPED | OUTPATIENT
Start: 2021-03-16

## 2021-03-15 RX ORDER — GABAPENTIN 100 MG/1
100 CAPSULE ORAL 3 TIMES DAILY
Qty: 60 CAPSULE | Refills: 0 | Status: SHIPPED | OUTPATIENT
Start: 2021-03-15

## 2021-03-15 RX ADMIN — TECHNETIUM TC 99M SESTAMIBI 1 DOSE: 1 INJECTION INTRAVENOUS at 11:54

## 2021-03-15 RX ADMIN — ACETAMINOPHEN 650 MG: 325 TABLET ORAL at 05:04

## 2021-03-15 RX ADMIN — ALPRAZOLAM 0.5 MG: 0.5 TABLET ORAL at 05:04

## 2021-03-15 RX ADMIN — GABAPENTIN 100 MG: 100 CAPSULE ORAL at 15:36

## 2021-03-15 RX ADMIN — ONDANSETRON 4 MG: 2 INJECTION, SOLUTION INTRAMUSCULAR; INTRAVENOUS at 13:28

## 2021-03-15 RX ADMIN — TECHNETIUM TC 99M SESTAMIBI 1 DOSE: 1 INJECTION INTRAVENOUS at 11:00

## 2021-03-15 RX ADMIN — Medication 10 ML: at 08:46

## 2021-03-15 RX ADMIN — PANTOPRAZOLE SODIUM 40 MG: 40 TABLET, DELAYED RELEASE ORAL at 05:04

## 2021-03-15 RX ADMIN — REGADENOSON 0.4 MG: 0.08 INJECTION, SOLUTION INTRAVENOUS at 11:55

## 2021-03-15 RX ADMIN — ENOXAPARIN SODIUM 40 MG: 40 INJECTION SUBCUTANEOUS at 15:36

## 2021-03-15 RX ADMIN — ALPRAZOLAM 0.5 MG: 0.5 TABLET ORAL at 13:32

## 2021-03-15 RX ADMIN — DULOXETINE HYDROCHLORIDE 30 MG: 30 CAPSULE, DELAYED RELEASE ORAL at 15:36

## 2021-03-15 NOTE — CONSULTS
"  Referring Provider: Dr. Mccrary  Reason for Consultation: Anxiety      Chief complaint \"I just have too much going on, physically and mentally\"    Subjective .     History of present illness:  The patient is a 40 y.o.white female with PMH significant for GERD, chronic pain, fibromyalgia, anxiety, depression, and hx of opiate abuse who was admitted secondary to chest pain.  Psych was consulted by Dr. Mccrary secondary to anxiety.  Patient reports a long hx of depression and anxiety, starting in her teens.  She has been on numerous medications throughout the years that were ineffective (Paxil, Wellbutrin, Vistaril, Buspar, Klonopin, to name a few).  She reports having to discontinue her antidepressant, mood stabilizer and anxiety meds 3 yrs ago when she started a new job and did not have insurance option.  She has chronic pain from 3 back surgeries and femur surgery, was cut off from pain meds, began snorting heroin.  She has been in detox twice and rehab, reports now taking an occasional one of her Mom's pain pills when her pain is severe.  Her mother was dx with cancer and patient moved from [place] to [place], [place] to be her mother's care giver.  Her mom has been petrified about getting COVID so patient has been in almost constant quarantine with her mom to keep her safe, is missing her fiance' and her daughter.   She is feeling overwhelmed with physical pain and anxiety.  She denies Si/HI.  She denies feeling hopeless or helpless.  She has difficulty sleeping, as well, and has been taking one a day of her Mother's Xanax, either during the day for panic attack or at night to help her sleep.    Past Psych Hx: Depression, anxiety, substance abuse; hospitalized numerous times at [facility] for anxiety and depression and twice for detox; no SAs      Review of Systems   Constitutional: Positive for fatigue. Negative for chills and fever.   HENT: Negative.    Eyes: Negative.    Respiratory: Positive for chest tightness and " shortness of breath.    Cardiovascular: Positive for chest pain and palpitations. Negative for leg swelling.   Gastrointestinal: Negative.    Endocrine: Negative.    Genitourinary: Negative.    Musculoskeletal: Positive for arthralgias, back pain and myalgias.   Skin: Negative.    Neurological: Positive for weakness and light-headedness.   Psychiatric/Behavioral: Positive for decreased concentration, dysphoric mood and sleep disturbance. Negative for agitation, behavioral problems, confusion, hallucinations, self-injury and suicidal ideas. The patient is nervous/anxious. The patient is not hyperactive.         History      Past Medical History:   Diagnosis Date   • Anxiety    • Chronic back pain    • Depression    • Hx of pneumothorax    • Neuropathy    • Sciatica    • Seasonal allergies           Family History   Problem Relation Age of Onset   • Heart disease Mother    • Hyperlipidemia Mother    • Hypertension Mother    • Hearing loss Maternal Grandmother    • Heart disease Maternal Grandmother    • Hyperlipidemia Maternal Grandmother    • Hearing loss Maternal Grandfather    • Heart disease Maternal Grandfather    • Hyperlipidemia Maternal Grandfather         Social History     Tobacco Use   • Smoking status: Current Every Day Smoker     Packs/day: 1.00     Types: Cigarettes   Substance Use Topics   • Alcohol use: Yes     Comment: occasionally   • Drug use: No          Medications Prior to Admission   Medication Sig Dispense Refill Last Dose   • acetaminophen (TYLENOL) 500 MG tablet Take 1,000 mg by mouth Daily.   3/14/2021 at Unknown time   • fluticasone (FLONASE) 50 MCG/ACT nasal spray 2 sprays into the nostril(s) as directed by provider Daily.   3/14/2021 at Unknown time   • ibuprofen (ADVIL,MOTRIN) 400 MG tablet Take 400 mg by mouth 2 (two) times a day.   3/14/2021 at Unknown time   • montelukast (SINGULAIR) 10 MG tablet Take 10 mg by mouth Every Night.   3/14/2021 at Unknown time       Scheduled  "Meds:DULoxetine, 30 mg, Oral, Daily  enoxaparin, 40 mg, Subcutaneous, Q24H  gabapentin, 100 mg, Oral, TID  montelukast, 10 mg, Oral, Nightly  pantoprazole, 40 mg, Oral, Q AM  sodium chloride, 10 mL, Intravenous, Q12H      Continuous Infusions:Pharmacy to Dose enoxaparin (LOVENOX),       PRN Meds:.•  acetaminophen **OR** acetaminophen **OR** acetaminophen  •  ALPRAZolam  •  bisacodyl  •  bisacodyl  •  melatonin  •  nitroglycerin  •  ondansetron **OR** ondansetron  •  Pharmacy to Dose enoxaparin (LOVENOX)  •  [COMPLETED] Insert peripheral IV **AND** sodium chloride  •  sodium chloride     Allergies:  Patient has no known allergies.      Objective     Vital Signs   /68 (BP Location: Left arm, Patient Position: Lying)   Pulse 73   Temp 98.2 °F (36.8 °C) (Oral)   Resp 14   Ht 165.1 cm (65\")   Wt 63.1 kg (139 lb 1.8 oz)   LMP 03/14/2021   SpO2 98%   BMI 23.15 kg/m²     Physical Exam:     General Appearance:    NAD   Head:    Normocephalic, without obvious abnormality, atraumatic   Eyes:            Lids and lashes normal, conjunctivae and sclerae normal, no   icterus, no pallor, corneas clear, PERRLA   Skin:   No bleeding, bruising or rash          Neurologic:   Cranial nerves 2 - 12 grossly intact, sensation intact, DTR       present and equal bilaterally     Musculoskeletal: Muscle tone and    strength WNL; gait no    assessed, lying in bed      Mental Status Exam:   Hygiene:   good  Cooperation:  Cooperative  Eye Contact:  Good  Psychomotor Behavior:  Restless  Affect:  Blunted  Mood: anxious  Hopelessness: Denies  Speech:  Normal rate and volume   Language intact  Associations intact  Thought Process:  Goal directed  Thought Content:  Normal  Suicidal:  None  Homicidal:  None  Hallucinations:  None  Delusion:  None  Memory:  Remote and recent memory intact   Attention span and concentration somewhat decreased  Fund of Knowledge full  Orientation:  Person, Place, Time and Situation  Reliability:  " good  Insight:  Good  Judgement:  Good  Impulse Control:  Good  Physical/Medical Issues:  Yes     Medications and allergies were reviewed by this provider.     Lab Results   Component Value Date    GLUCOSE 122 (H) 03/15/2021    CALCIUM 9.1 03/15/2021     03/15/2021    K 3.5 03/15/2021    CO2 26.0 03/15/2021     03/15/2021    BUN 9 03/15/2021    CREATININE 0.64 03/15/2021    EGFRIFNONA 103 03/15/2021    BCR 14.1 03/15/2021    ANIONGAP 11.0 03/15/2021       Last Urine Toxicity    There is no flowsheet data to display.         No results found for: PHENYTOIN, PHENOBARB, VALPROATE, CBMZ    Lab Results   Component Value Date     03/15/2021    BUN 9 03/15/2021    CREATININE 0.64 03/15/2021    WBC 10.10 03/15/2021       Brief Urine Lab Results     None          ECG/EMG Results (last 72 hours)     Procedure Component Value Units Date/Time    ECG 12 Lead [[id number]] Collected: 03/14/21 1538     Updated: 03/14/21 1539     QT Interval 357 ms     Narrative:      HEART RATE= 81  bpm  RR Interval= 744  ms  MD Interval= 139  ms  P Horizontal Axis= -19  deg  P Front Axis= 64  deg  QRSD Interval= 88  ms  QT Interval= 357  ms  QRS Axis= 73  deg  T Wave Axis= 57  deg  - NORMAL ECG -  Sinus rhythm  Electronically Signed By:   Date and Time of Study: 2021-03-14 15:38:14          Assessment/Plan       Chest pain    Dyspnea    Gastroesophageal reflux disease without esophagitis    Left flank tenderness    Generalized anxiety disorder    Panic attacks       LABS: Reviewed    Assessment:   Generalized anxiety disorder  MDD, recurrent, moderate    Treatment Plan:   Patient is alert and oriented, very anxious, weepy, depressed with no SI.  Social work has been consulted to help with insurance and referral to [employer]  Start Cymbalta 30mg daily  Start Gabapentin 100mg TID for anxiety and mood stabilizer  Continue PRN Xanax while inpatient  Patient was also given contact info for [facility] to call for appt if  she does not go to [facility]  Patient may be discharged when medically cleared.     Treatment Plan discussed with: Patient and Nursing staff      I discussed the patients findings and my recommendations with patient and nursing staff    I have reviewed and approved the behavioral health treatment plans and problem list. Yes  Thank you for the consult   Referring MD has access to the consult report and progress notes in EMR     Grace Díaz PA-C  03/15/21  15:16 EDT    Patient was examined wearing appropriate PPE.    EMR Dragon transcription disclaimer:  Some of this encounter note is an electronic transcription translation of spoken language to printed text. The electronic translation of spoken language may permit erroneous, or at times, nonsensical words or phrases to be inadvertently transcribed; Although I have reviewed the note for such errors some may still exist.

## 2021-03-15 NOTE — PLAN OF CARE
Goal Outcome Evaluation:  Plan of Care Reviewed With: patient  Progress: improving  Outcome Summary: Awaiting myoview results today. Pt. will potentially discharge later today pending myoview results. Pt. has notable anxiety relieved with PRN Xanax. Will monitor closely.

## 2021-03-15 NOTE — PLAN OF CARE
Problem: Adult Inpatient Plan of Care  Goal: Plan of Care Review  Outcome: Ongoing, Progressing  Flowsheets  Taken 3/15/2021 0324 by Tonny Persaud, RN  Plan of Care Reviewed With: patient  Taken 3/14/2021 1842 by Courtney Borja, RN  Outcome Summary: New admit for chest pian will have a stress test tomorrow, will monitor   Goal Outcome Evaluation:  Plan of Care Reviewed With: patient

## 2021-03-15 NOTE — DISCHARGE SUMMARY
[facility] Medicine Services  DISCHARGE SUMMARY        Prepared For PCP:  Provider, No Known    Patient Name: Pamela Bateman  : 1980  MRN: [id number]      Date of Admission:   3/14/2021    Date of Discharge:  3/15/2021    Length of stay:  LOS: 0 days     Hospital Course     Presenting Problem:   Dyspnea, unspecified type [R06.00]  Chest pain, unspecified type [R07.9]      Active Hospital Problems    Diagnosis  POA   • Chest pain [R07.9]  Yes   • Dyspnea [R06.00]  Yes   • Gastroesophageal reflux disease without esophagitis [K21.9]  Yes   • Left flank tenderness [R10.819]  Yes   • Generalized anxiety disorder [F41.1]  Yes   • Panic attacks [F41.0]  Yes      Resolved Hospital Problems   No resolved problems to display.           Hospital Course:  Pamela Bateman is a 40 y.o. female with anxiety/depression, panic attacks, GERD, chronic back pain, history of substance abuse, and history of pneumothorax, who presents to [facility] complaining of multiple vague complaints but mostly left-sided chest wall pain.  She is a poor historian due to her untreated anxiety.  She tends to go off on tangential topics, and her speech is a little pressured.  She presents with progressive and worsening reflux and heartburn which has caused some pain in the left and center of her chest radiating up towards the left shoulder.  She then began to experience some pain in her left flank and lateral chest wall.  She thought perhaps this was related to her previous history of pneumothorax especially when she became short of air so she came in for further evaluation. She has been taking 1 Xanax and 1 Norco 7.5 every night without prescription per INSPECT reviewed.  She has lost her insurance and has delayed ongoing medical care for several months due to the pandemic and also while caring for her mother who is terminally ill with cancer.  Due to underlying risk factors stress test was ordered which  was converted to Lexiscan due to her anxiety.  Stress test is negative per cardiology.  She was also started on PPI for possible GERD/PUD.  She agrees to follow-up with outpatient GI for EGD.  She was also seen by psych and restarted her on Cymbalta and gabapentin which she tolerated previously.  Scripts were given on discharge.  Patient still has underlying anxiety but overall feels better.  She has clinically improved and cleared to discharge today with follow-up with PCP, psych, and GI as an outpatient.      Recommendation for Outpatient Providers:     Follow-up with PCP, psych, and GI.        Reasons For Change In Medications and Indications for New Medications:    Started on Cymbalta, gabapentin, Protonix.  Zofran as needed for nausea.    Day of Discharge     HPI:   Patient seen examined the day of discharge.  Seen after the stress test today.  Had severe anxiety during the treadmill stress test, discussed with cardiology NP, initial treadmill stress test was fine.  Converted to Lexiscan which is also normal per cardiology.  Evaluated by psych, resumed on home Cymbalta and gabapentin, prescription provided on discharge.  Patient to follow-up with psych outpatient as well.  She also wants to follow-up with GI to rule out any PUD, PPI started here, recommend EGD as an outpatient with GI.    Vital Signs:   Temp:  [98.1 °F (36.7 °C)-98.3 °F (36.8 °C)] 98.2 °F (36.8 °C)  Heart Rate:  [55-88] 73  Resp:  [14-20] 14  BP: (100-166)/(57-84) 127/68     Physical Exam:  General: Awake, alert, NAD  Eyes: PERRL, EOMI, conjunctive are clear  Cardiovascular: Regular rate and rhythm, no murmurs  Respiratory: Clear to auscultation bilaterally, no wheezing or rales, unlabored breathing  Abdomen: Soft, epigastric tenderness noted on deep palpation, positive bowel sounds, no guarding  Neurologic: A&O, CN grossly intact, moves all extremities spontaneously  Musculoskeletal: Normal range of motion, no deformities  Skin: Warm, dry,  intact      Pertinent  and/or Most Recent Results     Results from last 7 days   Lab Units 03/15/21  0529 03/14/21  1549   WBC 10*3/mm3 10.10 12.60*   HEMOGLOBIN g/dL 13.2 14.1   HEMATOCRIT % 37.6 38.8   PLATELETS 10*3/mm3 463* 465*   SODIUM mmol/L 142 142   POTASSIUM mmol/L 3.5 3.9   CHLORIDE mmol/L 105 106   CO2 mmol/L 26.0 20.0*   BUN mg/dL 9 6   CREATININE mg/dL 0.64 0.69   GLUCOSE mg/dL 122* 128*   CALCIUM mg/dL 9.1 10.2     Results from last 7 days   Lab Units 03/14/21  1549   BILIRUBIN mg/dL 0.5   ALK PHOS U/L 128*   ALT (SGPT) U/L 18   AST (SGOT) U/L 12     Results from last 7 days   Lab Units 03/15/21  0529   CHOLESTEROL mg/dL 153   TRIGLYCERIDES mg/dL 60   HDL CHOL mg/dL 43     Results from last 7 days   Lab Units 03/15/21  0529 03/14/21  2341 03/14/21  1813 03/14/21  1549   PROBNP pg/mL  --   --   --  50.6   TROPONIN T ng/mL <0.010 <0.010 <0.010 <0.010       Brief Urine Lab Results     None          Microbiology Results Abnormal     Procedure Component Value - Date/Time    Respiratory Panel PCR w/COVID-19(SARS-CoV-2) GINA/THERESE/GEN/PAD/COR/MAD/ANGEL In-House, NP Swab in UTM/VTM, 3-4 HR TAT - Swab, Nasopharynx [[id number]]  (Normal) Collected: 03/14/21 1550    Lab Status: Final result Specimen: Swab from Nasopharynx Updated: 03/14/21 1644     ADENOVIRUS, PCR Not Detected     Coronavirus 229E Not Detected     Coronavirus HKU1 Not Detected     Coronavirus NL63 Not Detected     Coronavirus OC43 Not Detected     COVID19 Not Detected     Human Metapneumovirus Not Detected     Human Rhinovirus/Enterovirus Not Detected     Influenza A PCR Not Detected     Influenza B PCR Not Detected     Parainfluenza Virus 1 Not Detected     Parainfluenza Virus 2 Not Detected     Parainfluenza Virus 3 Not Detected     Parainfluenza Virus 4 Not Detected     RSV, PCR Not Detected     Bordetella pertussis pcr Not Detected     Bordetella parapertussis PCR Not Detected     Chlamydophila pneumoniae PCR Not Detected     Mycoplasma pneumo by  PCR Not Detected    Narrative:      Fact sheet for providers: https://docs.[employer]/wp-content/uploads/CFT6797-6407-BA2.1-EUA-Provider-Fact-Sheet-3.pdf    Fact sheet for patients: https://docs.[employer]/wp-content/uploads/MSU2130-3765-CN8.1-EUA-Patient-Fact-Sheet-1.pdf    Test performed by PCR.          XR Chest 1 View    Result Date: 3/14/2021  Impression: No active disease.  Electronically Signed By-Ant Vincent MD On:3/14/2021 4:29 PM This report was finalized on [id number] by  Ant Vincent MD.    CT Abdomen Pelvis Stone Protocol    Result Date: 3/15/2021  Impression:  1. No acute findings are seen within the abdomen or pelvis to explain the patient's left flank pain. 2. No urinary tract stone or hydronephrosis. 3. L5-S1 posterior spinal fusion, left hip ORIF, appendectomy, bilateral breast prosthesis..    Electronically Signed By-Alicia Dunaway MD On:3/15/2021 10:06 AM This report was finalized on [id number] by  Alicia Dunaway MD.                          Test Results Pending at Discharge        Procedures Performed           Consults:   Consults     Date and Time Order Name Status Description    3/15/2021 12:34 AM Inpatient Psychiatrist Consult      3/14/2021  5:05 PM Hospitalist (on-call MD unless specified) Completed             Discharge Details        Discharge Medications      New Medications      Instructions Start Date   DULoxetine 30 MG capsule  Commonly known as: CYMBALTA   30 mg, Oral, Daily   Start Date: March 16, 2021     gabapentin 100 MG capsule  Commonly known as: NEURONTIN   100 mg, Oral, 3 Times Daily      ondansetron 4 MG tablet  Commonly known as: ZOFRAN   4 mg, Oral, Every 6 Hours PRN      pantoprazole 40 MG EC tablet  Commonly known as: PROTONIX   40 mg, Oral, Daily         Continue These Medications      Instructions Start Date   acetaminophen 500 MG tablet  Commonly known as: TYLENOL   1,000 mg, Oral, Daily      fluticasone 50 MCG/ACT nasal spray  Commonly known as:  FLONASE   2 sprays, Nasal, Daily      montelukast 10 MG tablet  Commonly known as: SINGULAIR   10 mg, Oral, Nightly         Stop These Medications    ibuprofen 400 MG tablet  Commonly known as: ADVIL,MOTRIN            No Known Allergies      Discharge Disposition:  Home or Self Care    Diet:  Hospital:  Diet Order   Procedures   • Diet Cardiac; Healthy Heart         Discharge Activity:         CODE STATUS:    Code Status and Medical Interventions:   Ordered at: 03/14/21 1722     Level Of Support Discussed With:    Patient     Code Status:    CPR     Medical Interventions (Level of Support Prior to Arrest):    Full         Follow-up Appointments  No future appointments.          Condition on Discharge:      Stable      This patient has been examined wearing appropriate Personal Protective Equipment on 03/15/21      Electronically signed by Shyanne Reeder DO, 03/15/21, 4:55 PM EDT.      Time: I spent  26  minutes on this discharge activity which included face-to-face encounter with the patient/reviewing the data in the system/coordination of the care with the nursing staff as well as consultants/documentation/entering orders.

## 2021-03-15 NOTE — NURSING NOTE
Pt. Returned to unit from [facility]. Pt. C/o nausea and LUQ abdominal pain and voiced she felt anxious. PRN medication given for nausea and anxiety.

## 2021-03-16 LAB — QT INTERVAL: 357 MS

## 2025-07-27 ENCOUNTER — APPOINTMENT (OUTPATIENT)
Dept: MRI IMAGING | Facility: HOSPITAL | Age: 45
End: 2025-07-27
Payer: MEDICAID

## 2025-07-27 ENCOUNTER — HOSPITAL ENCOUNTER (EMERGENCY)
Facility: HOSPITAL | Age: 45
Discharge: HOME | End: 2025-07-27
Attending: EMERGENCY MEDICINE | Admitting: EMERGENCY MEDICINE
Payer: MEDICAID

## 2025-07-27 VITALS
BODY MASS INDEX: 21.22 KG/M2 | WEIGHT: 140 LBS | OXYGEN SATURATION: 95 % | HEART RATE: 68 BPM | SYSTOLIC BLOOD PRESSURE: 108 MMHG | TEMPERATURE: 97.8 F | DIASTOLIC BLOOD PRESSURE: 69 MMHG | RESPIRATION RATE: 18 BRPM | HEIGHT: 68 IN

## 2025-07-27 DIAGNOSIS — G89.29 ACUTE ON CHRONIC LOW BACK PAIN: Primary | ICD-10-CM

## 2025-07-27 DIAGNOSIS — M54.50 ACUTE ON CHRONIC LOW BACK PAIN: Primary | ICD-10-CM

## 2025-07-27 LAB
ALBUMIN SERPL-MCNC: 4 G/DL (ref 3.5–5.2)
ALBUMIN/GLOB SERPL: 2.1 G/DL
ALP SERPL-CCNC: 106 U/L (ref 39–117)
ALT SERPL W P-5'-P-CCNC: 14 U/L (ref 1–33)
ANION GAP SERPL CALCULATED.3IONS-SCNC: 9.8 MMOL/L (ref 5–15)
AST SERPL-CCNC: 13 U/L (ref 1–32)
BACTERIA UR QL AUTO: ABNORMAL /HPF
BASOPHILS # BLD AUTO: 0.08 10*3/MM3 (ref 0–0.2)
BASOPHILS NFR BLD AUTO: 0.5 % (ref 0–1.5)
BILIRUB SERPL-MCNC: 0.2 MG/DL (ref 0–1.2)
BILIRUB UR QL STRIP: NEGATIVE
BUN SERPL-MCNC: 5.4 MG/DL (ref 6–20)
BUN/CREAT SERPL: 10.2 (ref 7–25)
CALCIUM SPEC-SCNC: 9.2 MG/DL (ref 8.6–10.5)
CHLORIDE SERPL-SCNC: 106 MMOL/L (ref 98–107)
CLARITY UR: CLEAR
CO2 SERPL-SCNC: 24.2 MMOL/L (ref 22–29)
COLOR UR: YELLOW
CREAT SERPL-MCNC: 0.53 MG/DL (ref 0.57–1)
DEPRECATED RDW RBC AUTO: 48.5 FL (ref 37–54)
EGFRCR SERPLBLD CKD-EPI 2021: 117.1 ML/MIN/1.73
EOSINOPHIL # BLD AUTO: 0.07 10*3/MM3 (ref 0–0.4)
EOSINOPHIL NFR BLD AUTO: 0.5 % (ref 0.3–6.2)
ERYTHROCYTE [DISTWIDTH] IN BLOOD BY AUTOMATED COUNT: 13.4 % (ref 12.3–15.4)
GLOBULIN UR ELPH-MCNC: 1.9 GM/DL
GLUCOSE SERPL-MCNC: 106 MG/DL (ref 65–99)
GLUCOSE UR STRIP-MCNC: NEGATIVE MG/DL
HCT VFR BLD AUTO: 38.9 % (ref 34–46.6)
HGB BLD-MCNC: 12.9 G/DL (ref 12–15.9)
HGB UR QL STRIP.AUTO: ABNORMAL
HYALINE CASTS UR QL AUTO: ABNORMAL /LPF
IMM GRANULOCYTES # BLD AUTO: 0.04 10*3/MM3 (ref 0–0.05)
IMM GRANULOCYTES NFR BLD AUTO: 0.3 % (ref 0–0.5)
KETONES UR QL STRIP: NEGATIVE
LEUKOCYTE ESTERASE UR QL STRIP.AUTO: ABNORMAL
LYMPHOCYTES # BLD AUTO: 2.64 10*3/MM3 (ref 0.7–3.1)
LYMPHOCYTES NFR BLD AUTO: 17.4 % (ref 19.6–45.3)
MCH RBC QN AUTO: 32.1 PG (ref 26.6–33)
MCHC RBC AUTO-ENTMCNC: 33.2 G/DL (ref 31.5–35.7)
MCV RBC AUTO: 96.8 FL (ref 79–97)
MONOCYTES # BLD AUTO: 0.65 10*3/MM3 (ref 0.1–0.9)
MONOCYTES NFR BLD AUTO: 4.3 % (ref 5–12)
NEUTROPHILS NFR BLD AUTO: 11.7 10*3/MM3 (ref 1.7–7)
NEUTROPHILS NFR BLD AUTO: 77 % (ref 42.7–76)
NITRITE UR QL STRIP: NEGATIVE
NRBC BLD AUTO-RTO: 0 /100 WBC (ref 0–0.2)
PH UR STRIP.AUTO: 7 [PH] (ref 5–8)
PLATELET # BLD AUTO: 474 10*3/MM3 (ref 140–450)
PMV BLD AUTO: 9.3 FL (ref 6–12)
POTASSIUM SERPL-SCNC: 3.7 MMOL/L (ref 3.5–5.2)
PROT SERPL-MCNC: 5.9 G/DL (ref 6–8.5)
PROT UR QL STRIP: NEGATIVE
RBC # BLD AUTO: 4.02 10*6/MM3 (ref 3.77–5.28)
RBC # UR STRIP: ABNORMAL /HPF
REF LAB TEST METHOD: ABNORMAL
SODIUM SERPL-SCNC: 140 MMOL/L (ref 136–145)
SP GR UR STRIP: 1.01 (ref 1–1.03)
SQUAMOUS #/AREA URNS HPF: ABNORMAL /HPF
UROBILINOGEN UR QL STRIP: ABNORMAL
WBC # UR STRIP: ABNORMAL /HPF
WBC NRBC COR # BLD AUTO: 15.18 10*3/MM3 (ref 3.4–10.8)

## 2025-07-27 PROCEDURE — 25010000002 KETOROLAC TROMETHAMINE PER 15 MG: Performed by: EMERGENCY MEDICINE

## 2025-07-27 PROCEDURE — 25010000002 HYDROMORPHONE PER 4 MG: Performed by: EMERGENCY MEDICINE

## 2025-07-27 PROCEDURE — 25010000002 DROPERIDOL PER 5 MG: Performed by: EMERGENCY MEDICINE

## 2025-07-27 PROCEDURE — 72158 MRI LUMBAR SPINE W/O & W/DYE: CPT

## 2025-07-27 PROCEDURE — 81001 URINALYSIS AUTO W/SCOPE: CPT | Performed by: EMERGENCY MEDICINE

## 2025-07-27 PROCEDURE — 96374 THER/PROPH/DIAG INJ IV PUSH: CPT

## 2025-07-27 PROCEDURE — 99285 EMERGENCY DEPT VISIT HI MDM: CPT

## 2025-07-27 PROCEDURE — 80053 COMPREHEN METABOLIC PANEL: CPT | Performed by: EMERGENCY MEDICINE

## 2025-07-27 PROCEDURE — A9579 GAD-BASE MR CONTRAST NOS,1ML: HCPCS | Performed by: EMERGENCY MEDICINE

## 2025-07-27 PROCEDURE — 96375 TX/PRO/DX INJ NEW DRUG ADDON: CPT

## 2025-07-27 PROCEDURE — 25010000002 GADOTERIDOL PER 1 ML: Performed by: EMERGENCY MEDICINE

## 2025-07-27 PROCEDURE — 25010000002 DEXAMETHASONE PER 1 MG: Performed by: EMERGENCY MEDICINE

## 2025-07-27 PROCEDURE — 85025 COMPLETE CBC W/AUTO DIFF WBC: CPT | Performed by: EMERGENCY MEDICINE

## 2025-07-27 RX ORDER — KETOROLAC TROMETHAMINE 30 MG/ML
15 INJECTION, SOLUTION INTRAMUSCULAR; INTRAVENOUS ONCE
Status: COMPLETED | OUTPATIENT
Start: 2025-07-27 | End: 2025-07-27

## 2025-07-27 RX ORDER — METHOCARBAMOL 500 MG/1
500 TABLET, FILM COATED ORAL 3 TIMES DAILY PRN
Qty: 15 TABLET | Refills: 0 | Status: SHIPPED | OUTPATIENT
Start: 2025-07-27

## 2025-07-27 RX ORDER — HYDROMORPHONE HYDROCHLORIDE 1 MG/ML
0.5 INJECTION, SOLUTION INTRAMUSCULAR; INTRAVENOUS; SUBCUTANEOUS ONCE
Refills: 0 | Status: COMPLETED | OUTPATIENT
Start: 2025-07-27 | End: 2025-07-27

## 2025-07-27 RX ORDER — GADOTERIDOL 279.3 MG/ML
15 INJECTION INTRAVENOUS
Status: COMPLETED | OUTPATIENT
Start: 2025-07-27 | End: 2025-07-27

## 2025-07-27 RX ORDER — DEXAMETHASONE SODIUM PHOSPHATE 4 MG/ML
10 INJECTION, SOLUTION INTRA-ARTICULAR; INTRALESIONAL; INTRAMUSCULAR; INTRAVENOUS; SOFT TISSUE ONCE
Status: COMPLETED | OUTPATIENT
Start: 2025-07-27 | End: 2025-07-27

## 2025-07-27 RX ORDER — SODIUM CHLORIDE 0.9 % (FLUSH) 0.9 %
10 SYRINGE (ML) INJECTION AS NEEDED
Status: DISCONTINUED | OUTPATIENT
Start: 2025-07-27 | End: 2025-07-27 | Stop reason: HOSPADM

## 2025-07-27 RX ORDER — DROPERIDOL 2.5 MG/ML
2.5 INJECTION, SOLUTION INTRAMUSCULAR; INTRAVENOUS ONCE
Status: COMPLETED | OUTPATIENT
Start: 2025-07-27 | End: 2025-07-27

## 2025-07-27 RX ORDER — MELOXICAM 7.5 MG/1
7.5 TABLET ORAL DAILY PRN
Qty: 10 TABLET | Refills: 0 | Status: SHIPPED | OUTPATIENT
Start: 2025-07-27

## 2025-07-27 RX ADMIN — DROPERIDOL 2.5 MG: 2.5 INJECTION, SOLUTION INTRAMUSCULAR; INTRAVENOUS at 13:59

## 2025-07-27 RX ADMIN — KETOROLAC TROMETHAMINE 15 MG: 30 INJECTION INTRAMUSCULAR; INTRAVENOUS at 13:59

## 2025-07-27 RX ADMIN — DEXAMETHASONE SODIUM PHOSPHATE 10 MG: 4 INJECTION, SOLUTION INTRAMUSCULAR; INTRAVENOUS at 13:59

## 2025-07-27 RX ADMIN — HYDROMORPHONE HYDROCHLORIDE 0.5 MG: 1 INJECTION, SOLUTION INTRAMUSCULAR; INTRAVENOUS; SUBCUTANEOUS at 13:59

## 2025-07-27 RX ADMIN — GADOTERIDOL 13 ML: 279.3 INJECTION, SOLUTION INTRAVENOUS at 16:05

## 2025-07-27 NOTE — ED PROVIDER NOTES
Subjective   History of Present Illness  Chief complaint severe low back pain    History of present is a 44-year-old female with a long history of back pain with several surgeries who complains of increasing pain in the lower back over the last several days.  Worse with movement better with rest no specific trauma.  Denies fever chills or sweats no abdominal pain no black or bloody stool no dysuria frequency no loss of bladder bowel control no numbness or weakness around the perineal area or to the extremities.  She states it does radiate down both the legs.  Patient is getting so severe with the pain she had taken some morphine and Percocet last night without relief no new injury.  No abdominal pain normal appetite.  No weight loss no night sweats.  No new injury.      Review of Systems   Constitutional:  Negative for chills, fever and unexpected weight change.   Respiratory:  Negative for chest tightness and shortness of breath.    Cardiovascular:  Negative for chest pain.   Gastrointestinal:  Negative for abdominal pain, blood in stool and vomiting.   Genitourinary:  Negative for decreased urine volume, difficulty urinating, dysuria and vaginal discharge.   Musculoskeletal:  Positive for back pain.   Skin:  Negative for rash.   Neurological:  Negative for weakness and numbness.   Psychiatric/Behavioral:  Negative for confusion.        Past Medical History:   Diagnosis Date    Anxiety     Chronic back pain     Depression     Hx of pneumothorax     Neuropathy     Sciatica     Seasonal allergies        No Known Allergies    Past Surgical History:   Procedure Laterality Date    APPENDECTOMY      BACK SURGERY      EAR TUBES      TUBAL ABDOMINAL LIGATION      WISDOM TOOTH EXTRACTION         Family History   Problem Relation Age of Onset    Heart disease Mother     Hyperlipidemia Mother     Hypertension Mother     Hearing loss Maternal Grandmother     Heart disease Maternal Grandmother     Hyperlipidemia Maternal  Grandmother     Hearing loss Maternal Grandfather     Heart disease Maternal Grandfather     Hyperlipidemia Maternal Grandfather        Social History     Socioeconomic History    Marital status: Single   Tobacco Use    Smoking status: Every Day     Current packs/day: 1.00     Types: Cigarettes   Substance and Sexual Activity    Alcohol use: Yes     Comment: occasionally    Drug use: No    Sexual activity: Yes     Partners: Male     Prior to Admission medications    Medication Sig Start Date End Date Taking? Authorizing Provider   acetaminophen (TYLENOL) 500 MG tablet Take 1,000 mg by mouth Daily.    ProviderAdarsh MD   DULoxetine (CYMBALTA) 30 MG capsule Take 1 capsule by mouth Daily. 3/16/21   Shyanne Reeder DO   fluticasone (FLONASE) 50 MCG/ACT nasal spray 2 sprays into the nostril(s) as directed by provider Daily.    Adarsh Lockhart MD   gabapentin (NEURONTIN) 100 MG capsule Take 1 capsule by mouth 3 (Three) Times a Day. 3/15/21   Shyanne Reeder DO   meloxicam (MOBIC) 7.5 MG tablet Take 1 tablet by mouth Daily As Needed for Moderate Pain. 7/27/25   Neal Marrufo MD   methocarbamol (ROBAXIN) 500 MG tablet Take 1 tablet by mouth 3 (Three) Times a Day As Needed for Muscle Spasms. 7/27/25   Neal Marrufo MD   montelukast (SINGULAIR) 10 MG tablet Take 10 mg by mouth Every Night.    ProviderAdarsh MD   ondansetron (ZOFRAN) 4 MG tablet Take 1 tablet by mouth Every 6 (Six) Hours As Needed for Nausea or Vomiting. 3/15/21   Shyanne Reeder DO   pantoprazole (PROTONIX) 40 MG EC tablet Take 1 tablet by mouth Daily. 3/15/21   Shyanne Reeder DO          Objective   Physical Exam  Constitutional this is a 44-year-old awake alert no acute distress triage vital signs reviewed.  HEENT extraocular muscles are intact pupils equal round react there is no photophobia sclera clear neck supple no adenopathy no meningeal signs no JVD no bruits lungs clear no retractions heart regular without murmur abdomen  soft nontender good bowel sounds no peritoneal findings or pulsatile masses.  Extremities pulses equal upper lower extremities no edema no cords no Homans' sign no evidence of DVT skin warm dry without rashes or cellulitic changes neurologic awake alert follows commands motor strength normal neck straight leg testing toes up to including big toe dorsiflex plantarflex at difficulty reflexes 1+ symmetrical knees and ankles.  No weakness in the legs good sensation including the buttock area and the legs.  Patient has tenderness throughout the lower lumbar spine but no step-off or deformity no redness crepitus subcutaneous air or signs of infection.  Procedures           ED Course      Results for orders placed or performed during the hospital encounter of 07/27/25   Comprehensive Metabolic Panel    Collection Time: 07/27/25  1:55 PM    Specimen: Blood   Result Value Ref Range    Glucose 106 (H) 65 - 99 mg/dL    BUN 5.4 (L) 6.0 - 20.0 mg/dL    Creatinine 0.53 (L) 0.57 - 1.00 mg/dL    Sodium 140 136 - 145 mmol/L    Potassium 3.7 3.5 - 5.2 mmol/L    Chloride 106 98 - 107 mmol/L    CO2 24.2 22.0 - 29.0 mmol/L    Calcium 9.2 8.6 - 10.5 mg/dL    Total Protein 5.9 (L) 6.0 - 8.5 g/dL    Albumin 4.0 3.5 - 5.2 g/dL    ALT (SGPT) 14 1 - 33 U/L    AST (SGOT) 13 1 - 32 U/L    Alkaline Phosphatase 106 39 - 117 U/L    Total Bilirubin 0.2 0.0 - 1.2 mg/dL    Globulin 1.9 gm/dL    A/G Ratio 2.1 g/dL    BUN/Creatinine Ratio 10.2 7.0 - 25.0    Anion Gap 9.8 5.0 - 15.0 mmol/L    eGFR 117.1 >60.0 mL/min/1.73   CBC Auto Differential    Collection Time: 07/27/25  1:55 PM    Specimen: Blood   Result Value Ref Range    WBC 15.18 (H) 3.40 - 10.80 10*3/mm3    RBC 4.02 3.77 - 5.28 10*6/mm3    Hemoglobin 12.9 12.0 - 15.9 g/dL    Hematocrit 38.9 34.0 - 46.6 %    MCV 96.8 79.0 - 97.0 fL    MCH 32.1 26.6 - 33.0 pg    MCHC 33.2 31.5 - 35.7 g/dL    RDW 13.4 12.3 - 15.4 %    RDW-SD 48.5 37.0 - 54.0 fl    MPV 9.3 6.0 - 12.0 fL    Platelets 474 (H) 140 -  450 10*3/mm3    Neutrophil % 77.0 (H) 42.7 - 76.0 %    Lymphocyte % 17.4 (L) 19.6 - 45.3 %    Monocyte % 4.3 (L) 5.0 - 12.0 %    Eosinophil % 0.5 0.3 - 6.2 %    Basophil % 0.5 0.0 - 1.5 %    Immature Grans % 0.3 0.0 - 0.5 %    Neutrophils, Absolute 11.70 (H) 1.70 - 7.00 10*3/mm3    Lymphocytes, Absolute 2.64 0.70 - 3.10 10*3/mm3    Monocytes, Absolute 0.65 0.10 - 0.90 10*3/mm3    Eosinophils, Absolute 0.07 0.00 - 0.40 10*3/mm3    Basophils, Absolute 0.08 0.00 - 0.20 10*3/mm3    Immature Grans, Absolute 0.04 0.00 - 0.05 10*3/mm3    nRBC 0.0 0.0 - 0.2 /100 WBC   Urinalysis With Microscopic If Indicated (No Culture) - Urine, Clean Catch    Collection Time: 07/27/25  5:42 PM    Specimen: Urine, Clean Catch   Result Value Ref Range    Color, UA Yellow Yellow, Straw    Appearance, UA Clear Clear    pH, UA 7.0 5.0 - 8.0    Specific Gravity, UA 1.014 1.005 - 1.030    Glucose, UA Negative Negative    Ketones, UA Negative Negative    Bilirubin, UA Negative Negative    Blood, UA Small (1+) (A) Negative    Protein, UA Negative Negative    Leuk Esterase, UA Trace (A) Negative    Nitrite, UA Negative Negative    Urobilinogen, UA 1.0 E.U./dL 0.2 - 1.0 E.U./dL   Urinalysis, Microscopic Only - Urine, Clean Catch    Collection Time: 07/27/25  5:42 PM    Specimen: Urine, Clean Catch   Result Value Ref Range    RBC, UA 3-5 (A) None Seen, 0-2 /HPF    WBC, UA 3-5 (A) None Seen, 0-2 /HPF    Bacteria, UA None Seen None Seen /HPF    Squamous Epithelial Cells, UA 3-6 (A) None Seen, 0-2 /HPF    Hyaline Casts, UA None Seen None Seen /LPF    Methodology Automated Microscopy      MRI Lumbar Spine With & Without Contrast  Result Date: 7/27/2025  Postop changes. Moderate right foraminal narrowing at L5-S1. Electronically Signed: Remigio Mobley MD  7/27/2025 4:36 PM EDT  Workstation ID: [id number]    Medications   sodium chloride 0.9 % flush 10 mL (has no administration in time range)   HYDROmorphone (DILAUDID) injection 0.5 mg (0.5 mg Intravenous  Given 7/27/25 1359)   droperidol (INAPSINE) injection 2.5 mg (2.5 mg Intravenous Given 7/27/25 1359)   dexAMETHasone (DECADRON) injection 10 mg (10 mg Intravenous Given 7/27/25 1359)   ketorolac (TORADOL) injection 15 mg (15 mg Intravenous Given 7/27/25 1359)   gadoteridol (PROHANCE) injection 15 mL (13 mL Intravenous Given 7/27/25 1605)                                                        Medical Decision Making  Medical Decision Making.  IV established was given Dilaudid 0.5 mg IV Inapsine 2.5 mg IV Decadron 10 mg IV and Toradol 15 mg IV.  Urine was negative.  Labs obtained my independent review CBC white count was 15,000 comprehensive metabolic profile unremarkable and a potassium of 5.4.  He has a white count of 15,000 and the pain an MRI of the lumbar spine with and without contrast obtained read by the radiologist report reviewed by me moderate right foraminal narrowing at L5-S1 without other acute findings.  Patient exam is feeling better at this point pain was much improved.  I do not see any evidence suggest acute cauda equina epidural abscess transverse myelitis intra-abdominal process aneurysm dissection spinal cord infarction DVT arterial compromise vascular compromise infection based on my history and physical clinical signs although not a complete list of all possibilities.  We talked about what to return for she voiced understanding stable otherwise unremarkable.  ER course.    Problems Addressed:  Acute on chronic low back pain: complicated acute illness or injury    Amount and/or Complexity of Data Reviewed  Labs: ordered. Decision-making details documented in ED Course.  Radiology: ordered.    Risk  Prescription drug management.        Final diagnoses:   Acute on chronic low back pain       ED Disposition  ED Disposition       ED Disposition   Discharge    Condition   Stable    Comment   --               PATIENT CONNECTION - [facility] [address]  [phone]  In 1 day      Neal Echevarria  MD VEGA  [facility] [place] [address] [phone]    In 1 day           Medication List        New Prescriptions      meloxicam 7.5 MG tablet  Commonly known as: MOBIC  Take 1 tablet by mouth Daily As Needed for Moderate Pain.     methocarbamol 500 MG tablet  Commonly known as: ROBAXIN  Take 1 tablet by mouth 3 (Three) Times a Day As Needed for Muscle Spasms.               Where to Get Your Medications        These medications were sent to [id number] - [place], [place] - [address] AT [facility] - [phone]  - [phone]   [address] [facility] [place] [address]      Phone: [phone]   meloxicam 7.5 MG tablet  methocarbamol 500 MG tablet            Neal Marrufo MD  07/29/25 6440

## 2025-07-27 NOTE — DISCHARGE INSTRUCTIONS
Mobic and Robaxin sent to your pharmacy.  Heating pad and Salonpas patches.  Activity as tolerated.  Follow-up as directed above.  Return for fever loss of bladder bowel control black or bloody stool bloody urine vomiting severe abdominal pain unable to walk weakness to extremities numbness in the buttock or vaginal area or any other new or worsening problems or concerns return immediately to the ER

## 2025-07-27 NOTE — ED NOTES
Patient reports hx of multiple back surgeries and back pain. Patient states pain began last night across lumbar, radiating down bilateral hips to lower extremities. Patient crying and restless in bed. EMS states aunt gave patient 30mg slow release morphine and 10mg oxycodone last night for pain, and states this morning when she woke up she couldn't walk. Patient unable to fill prescribed meds due to problems with insurance after moving to [place].

## 2025-07-27 NOTE — ED NOTES
Patient states she is not able to provide a urine specimen. Patient encouraged to attempt to provide urine specimen, but patient reports not at this time. Provider notified.